# Patient Record
(demographics unavailable — no encounter records)

---

## 2024-11-08 NOTE — HISTORY OF PRESENT ILLNESS
[FreeTextEntry1] :  Patient is a *** year para # (, C/S x#) who is referred by  [] for evaluation and management of ***.    Daytime frequency: Nocturia: Urinary urgency: Leakage of urine with urgency: Leakage of urine with coughing sneezing laughing: Incontinence pad use: Sensation of incomplete bladder emptying: History of frequent urinary tract infections: History of hematuria: History of nephrolithiasis:    Daily fluid intake: Previous treatments: Vaginal symptoms: Bowel symptoms:     GYN: LMP,  PMH: PSH: D&C, Sinus  Meds: Allx:

## 2024-11-08 NOTE — PROCEDURE
[Straight Catheterization] : insertion of a straight catheter [Urinary Tract Infection] : a urinary tract infection [Urinary Retention] : urinary retention [Patient] : the patient [___ Fr Straight Tip] : a [unfilled] in [place] straight tip catheter [None] : there were no complications with the catheter insertion [Clear] : clear [Culture] : culture [Urinalysis] : urinalysis [No Complications] : no complications [Tolerated Well] : the patient tolerated the procedure well [Post procedure instructions and information given] : Post procedure instructions and information were given and reviewed with patient. [0] : 0

## 2024-11-08 NOTE — PHYSICAL EXAM
[FreeTextEntry1] : General: Not in acute distress, alert and oriented x3. Neck: Supple. No lymphadenopathy.  Skin: Color, texture, and turgor are normal. No acute rashes or lesions Abdomen: Soft, nontender, and nondistended. No obvious hepatosplenomegaly. No obvious hernias. A well-healed paramedian vertical skin incision. Pelvic Exam: Normal external female genitalia. Saddle sensory exam S2 to S4 is intact. Perineal reflexes not visualized. Urethra is hypermobile without prolapse, exudates, or lesions. Cough stress test is negative with and without anterior vaginal wall reduction. Redundant tisse noted along the distal anterior vaginal wall. Post void residual was checked with I/O cath and was 80 cc of clear urine. Pale and mildly atrophic-appearing vaginal epithelium. No vaginal blood or discharge. Cervix without abnormal lesions. Bimanual exam reveals a small uterus in normal positioning. No adnexal masses or tenderness. Levator ani contraction is 2/5. Rectovaginal exam: perineal pocket present. No enterocele or rectal prolapse appreciated.   POPQ: Aa 0, Ba 0, C -5, TVL 10, D-7, GH 4, PB 4.5, Ap -1.5, Bp -1.5.